# Patient Record
Sex: FEMALE | Race: WHITE | HISPANIC OR LATINO | URBAN - METROPOLITAN AREA
[De-identification: names, ages, dates, MRNs, and addresses within clinical notes are randomized per-mention and may not be internally consistent; named-entity substitution may affect disease eponyms.]

---

## 2018-07-23 ENCOUNTER — OFFICE VISIT (OUTPATIENT)
Dept: OBGYN CLINIC | Facility: CLINIC | Age: 48
End: 2018-07-23
Payer: COMMERCIAL

## 2018-07-23 VITALS
BODY MASS INDEX: 33.2 KG/M2 | WEIGHT: 147.6 LBS | HEIGHT: 56 IN | HEART RATE: 79 BPM | DIASTOLIC BLOOD PRESSURE: 83 MMHG | SYSTOLIC BLOOD PRESSURE: 117 MMHG

## 2018-07-23 DIAGNOSIS — R30.9 PAINFUL URINATION: Primary | ICD-10-CM

## 2018-07-23 DIAGNOSIS — R31.9 URINARY TRACT INFECTION WITH HEMATURIA, SITE UNSPECIFIED: ICD-10-CM

## 2018-07-23 DIAGNOSIS — N39.0 URINARY TRACT INFECTION WITH HEMATURIA, SITE UNSPECIFIED: ICD-10-CM

## 2018-07-23 LAB
SL AMB  POCT GLUCOSE, UA: NEGATIVE
SL AMB LEUKOCYTE ESTERASE,UA: NEGATIVE
SL AMB POCT BILIRUBIN,UA: ABNORMAL
SL AMB POCT BLOOD,UA: ABNORMAL
SL AMB POCT CLARITY,UA: ABNORMAL
SL AMB POCT COLOR,UA: YELLOW
SL AMB POCT KETONES,UA: ABNORMAL
SL AMB POCT NITRITE,UA: POSITIVE
SL AMB POCT PH,UA: 6
SL AMB POCT SPECIFIC GRAVITY,UA: 1.02
SL AMB POCT URINE PROTEIN: NEGATIVE
SL AMB POCT UROBILINOGEN: 0.2

## 2018-07-23 PROCEDURE — 87186 SC STD MICRODIL/AGAR DIL: CPT | Performed by: NURSE PRACTITIONER

## 2018-07-23 PROCEDURE — 99202 OFFICE O/P NEW SF 15 MIN: CPT | Performed by: NURSE PRACTITIONER

## 2018-07-23 PROCEDURE — 87077 CULTURE AEROBIC IDENTIFY: CPT | Performed by: NURSE PRACTITIONER

## 2018-07-23 PROCEDURE — 81002 URINALYSIS NONAUTO W/O SCOPE: CPT | Performed by: NURSE PRACTITIONER

## 2018-07-23 PROCEDURE — 87086 URINE CULTURE/COLONY COUNT: CPT | Performed by: NURSE PRACTITIONER

## 2018-07-23 RX ORDER — IBUPROFEN 600 MG/1
TABLET ORAL
COMMUNITY
Start: 2015-04-17

## 2018-07-23 RX ORDER — NITROFURANTOIN 25; 75 MG/1; MG/1
100 CAPSULE ORAL 2 TIMES DAILY
Qty: 14 CAPSULE | Refills: 0 | Status: SHIPPED | OUTPATIENT
Start: 2018-07-23 | End: 2018-07-30

## 2018-07-23 NOTE — PATIENT INSTRUCTIONS
Disuria   LO QUE NECESITA SABER:   La disuria es la dificultad al orinar, o el dolor, ardor o molestias al Isaac Sayres  La disuria por lo general es un síntoma de algún otro problema  INSTRUCCIONES SOBRE EL ROM HOSPITALARIA:   Regrese a la antony de emergencias si:   · Usted tiene dolor intenso en la espalda, en un costado o en el abdomen  · Usted tiene fiebre y escalofríos con temblor  · Usted vomita varias veces seguidas  Pregúntele a moeller [employer] vitaminas y minerales son adecuados para usted  · Sintia síntomas no desaparecen, aún después del tratamiento  · Usted tiene preguntas o inquietudes acerca de moeller condición o cuidado  Medicamentos:   · Medicamentos,  para ayudar a tratar rafael infección bacteriana o para disminuir los espasmos musculares  · [place] sintia medicamentos mynor se le haya indicado  Consulte con moeller médico si usted med que moeller medicamento no le está ayudando o si presenta efectos secundarios  Infórmele si es alérgico a algún medicamento  Mantenga rafael lista actualizada de los [place], las vitaminas y los productos herbales que margo  Incluya los siguientes datos de los medicamentos: cantidad, frecuencia y motivo de administración  Traiga con usted la lista o los envases de la píldoras a sintia citas de seguimiento  Lleve la lista de los medicamentos con usted en jaimie de rafael emergencia  Acuda a sintia consultas de control con moeller médico según le indicaron  Moeller médico podría referirlo a un urólogo o nefrólogo para que le realicen exámenes adicionales  Anote sintia preguntas para que se acuerde de hacerlas kevin sintia visitas  Controle moeller disuria:   · Ingiera más líquidos  Los líquidos ayudan a desechar las bacterias que estén provocando rafael infección  Pregunte a moeller médico sobre la cantidad de líquido que necesita alison todos los días y cuáles le recomienda  · [place] eagle de asiento mynor se le indique  Llene rafael marizol de baño con 4 a 6 pulgadas de [place]   [employer] usar un recipiente para baño de asiento que quepa en el inodoro  Siéntese en el baño de marizol por 20 minutos  Vega esto 2 a 3 veces a la semana según le indicaron  El agua cálida va a ayudara reducir el dolor y la inflamación  © 2017 [address] [employer] Information is for End User's use only and may not be sold, redistributed or otherwise used for commercial purposes  All illustrations and images included in CareNotes® are the copyrighted property of A D A M , Inc  or Pardeep [place]  Esta información es sólo para uso en educación  Denney intención no es darle un consejo médico sobre enfermedades o tratamientos  Colsulte con denney Dewain Racer farmacéutico antes de seguir cualquier régimen médico para saber si es seguro y efectivo para usted

## 2018-07-23 NOTE — PROGRESS NOTES
Assessment/Plan:    Will treat with nitrofurantoin 100 mgs , 1 pill b i d  for 7 days  Call if symptoms not improved within 2 days  Urine culture sent  Patient to return to office in 1-2 weeks for her annual gyn exam and Pap Co test      Diagnoses and all orders for this visit:    Painful urination  -     POCT urine dip  -     nitrofurantoin (MACROBID) 100 mg capsule; Take 1 capsule (100 mg total) by mouth 2 (two) times a day for 7 days    Urinary tract infection without hematuria, site unspecified  -     nitrofurantoin (MACROBID) 100 mg capsule; Take 1 capsule (100 mg total) by mouth 2 (two) times a day for 7 days    Other orders  -     ibuprofen (MOTRIN) 600 mg tablet; Take by mouth          Subjective:      Patient ID: Ryan Larose is a 51 y o  female  HPI 52 yo  here With complaints of pain with urination and voiding in small amounts that recently started  She denies any blood in her urine  Denies any fever chills  Denies any kind of vaginal discharge  She denies any pelvic pain or back pain  She is sexually active with her  for 2 years  She denies any STD history  Chart review shows last Pap was 2015 which was ASCUS with positive HPV, -16 and -18  Patient had a colpo which showed a normal appearing cervix ECC was negative  She was due for a repeat Pap and Co test 1 year later     Previous Pap done 2014 was normal with positive high-risk HPV, negative HPV 16 and 18  She will return for her annual gyn exam   Urine dip today shows positive nitrates, moderate blood, and cloudy appearance  Patient had history of tubal ligation  The following portions of the patient's history were reviewed and updated as appropriate: allergies, current medications, past family history, past medical history, past social history, past surgical history and problem list     Review of Systems   Respiratory: Negative  Cardiovascular: Negative  Genitourinary: Positive for dysuria and frequency  Negative for difficulty urinating, vaginal bleeding and vaginal discharge  Psychiatric/Behavioral: Negative  Objective:      /83 (BP Location: Left arm, Patient Position: Sitting, Cuff Size: Standard)   Pulse 79   Ht 4' 8" (1 422 m)   Wt 67 kg (147 lb 9 6 oz)   LMP 06/25/2018 (Approximate)   Breastfeeding? No   BMI 33 09 kg/m²          Physical Exam   Constitutional: She appears well-nourished  Cardiovascular: Normal rate, regular rhythm and normal heart sounds  Pulmonary/Chest: Effort normal and breath sounds normal    Abdominal: Soft  Bowel sounds are normal    Skin: Skin is dry  Psychiatric: She has a normal mood and affect   Her behavior is normal        Urine dip positive for moderate blood, positive nitrates

## 2018-07-25 LAB — BACTERIA UR CULT: ABNORMAL

## 2018-08-24 ENCOUNTER — TELEPHONE (OUTPATIENT)
Dept: UROLOGY | Facility: MEDICAL CENTER | Age: 48
End: 2018-08-24

## 2018-08-24 ENCOUNTER — TELEPHONE (OUTPATIENT)
Dept: UROLOGY | Facility: CLINIC | Age: 48
End: 2018-08-24

## 2018-08-24 NOTE — TELEPHONE ENCOUNTER
Please schedule for NP appt with PA or MD in 7 to 10 days,  Patient to bring copy of KUB on disc  Please obtain written KUB report from [facility], report should be final 8/25/18

## 2018-08-24 NOTE — TELEPHONE ENCOUNTER
Patient had abnormal test [facility] showing kidney stones with pain  [facility] assisting due to speaking Nepali  Reason for appointment/Complaint/Diagnosis : abnormal imaging showing kidney stones presenting with pain    Insurance: [facility]    History of Cancer? no                       If yes, what kind? none    Previous urologist?     no                  Records requested/where? [facility] [address]    Outside testing/where? [facility]    Location Preference for office visit?  [facility]

## 2018-08-24 NOTE — TELEPHONE ENCOUNTER
Records received, no copy of KUB,  Noted to be treated for acute pyelonephritis  [facility]ed [facility] at [phone], spoke to nursing, today's visit was a follow up for acute pyelonephritis, KUB done, it is not read by their radiologist yet  Advised to follow up with PCP  [facility] to [facility] Naye Ashley at [facility],  Patient initially presented on 8/20/18 for back pain/fever, treated for pyelonepritis with Levaquin, patient returned for recheck today, back pain resolved, having pelvic pressure, patient thinks she passed a small particle in urine, patient is on Levaquin, no acute distress, no definitive stone seen on KUB,  Patient started on Tamsulosin and provided with strainer and has copy of KUB on disc

## 2018-08-27 NOTE — TELEPHONE ENCOUNTER
No appointments within 7 to 10 days with [place] speaking doctor  Triage to coordinator for assistance with scheduling

## 2018-08-31 PROCEDURE — 82360 CALCULUS ASSAY QUANT: CPT | Performed by: UROLOGY

## 2018-08-31 PROCEDURE — 88300 SURGICAL PATH GROSS: CPT | Performed by: PATHOLOGY

## 2018-09-04 ENCOUNTER — LAB REQUISITION (OUTPATIENT)
Dept: LAB | Facility: HOSPITAL | Age: 48
End: 2018-09-04
Payer: COMMERCIAL

## 2018-09-04 DIAGNOSIS — N20.9 URINARY CALCULUS: ICD-10-CM

## 2018-09-08 LAB
AMM URATE MFR STONE: 15 %
CA PHOS MFR STONE: 5 %
CALCIUM OXALATE DIHYDRATE MFR STONE IR: 3 %
COLOR STONE: NORMAL
COM MFR STONE: 77 %
COMMENT-STONE3: NORMAL
COMPOSITION: NORMAL
LABORATORY COMMENT REPORT: NORMAL
LABORATORY COMMENT REPORT: NORMAL
NIDUS STONE QL: NORMAL
PHOTO: NORMAL
SIZE STONE: NORMAL MM
STONE ANALYSIS-IMP: NORMAL
WT STONE: 28.3 MG

## 2018-09-17 ENCOUNTER — TELEPHONE (OUTPATIENT)
Dept: UROLOGY | Facility: AMBULATORY SURGERY CENTER | Age: 48
End: 2018-09-17

## 2018-09-17 NOTE — TELEPHONE ENCOUNTER
Certified letter return to [facility]([phone]) unclaimed

## 2021-08-04 PROBLEM — Z00.00 ANNUAL PHYSICAL EXAM: Status: ACTIVE | Noted: 2021-08-04

## 2021-08-29 NOTE — PROGRESS NOTES
Assessment/Plan:         Problem List Items Addressed This Visit        Other    Annual physical exam - Primary     Routine labs         Relevant Orders    CBC and differential    Comprehensive metabolic panel    Lipid panel    Urinalysis with microscopic    Symptoms of depression     Will start  escitalopram  Side effect and warnings given to pt follow up 1 mo         Relevant Medications    escitalopram (LEXAPRO) 10 mg tablet      Other Visit Diagnoses     Need for hepatitis C screening test        Relevant Orders    Hepatitis C Antibody ([facility])    Encounter for screening mammogram for malignant neoplasm of breast        Relevant Orders    Mammo screening bilateral w 3d & cad    Encounter for screening colonoscopy        Relevant Orders    Ambulatory referral for colonoscopy    Immunization due        Relevant Orders    TDAP VACCINE GREATER THAN OR EQUAL TO 6YO IM            Subjective: new pt here for physical pt has had problems with depression still grieving mothers death  Last month pt was depressed before that crying spells all the time wants meds        Patient ID: Vilma Aldrich is a 45 y o  female  HPI    The following portions of the patient's history were reviewed and updated as appropriate:   Past Medical History:  She has no past medical history on file ,  _______________________________________________________________________  Medical Problems:  does not have any pertinent problems on file ,  _______________________________________________________________________  Past Surgical History:   has a past surgical history that includes Tubal ligation  ,  _______________________________________________________________________  Family History:  family history includes No Known Problems in her father and mother ,  _______________________________________________________________________  Social History:   reports that she has never smoked   She has never used smokeless tobacco  She reports current alcohol use  She reports that she does not use drugs  ,  _______________________________________________________________________  Allergies:  is allergic to penicillins     _______________________________________________________________________  Current Outpatient Medications   Medication Sig Dispense Refill    escitalopram (LEXAPRO) 10 mg tablet Take 1 tablet (10 mg total) by mouth daily 30 tablet 6    ibuprofen (MOTRIN) 600 mg tablet Take by mouth (Patient not taking: Reported on 9/1/2021)       No current facility-administered medications for this visit      _______________________________________________________________________  Review of Systems   Constitutional: Negative for appetite change, chills, fatigue and fever  Respiratory: Negative for cough, chest tightness and shortness of breath  Cardiovascular: Negative for chest pain, palpitations and leg swelling  Gastrointestinal: Negative for abdominal pain, constipation, diarrhea, nausea and vomiting  Genitourinary: Negative for difficulty urinating and frequency  Musculoskeletal: Negative for arthralgias, back pain and neck pain  Skin: Negative for rash  Neurological: Negative for dizziness, weakness, light-headedness, numbness and headaches  Hematological: Does not bruise/bleed easily  Psychiatric/Behavioral: Negative for dysphoric mood and sleep disturbance  The patient is not nervous/anxious  Objective:  Vitals:    09/01/21 0845   BP: 124/84   BP Location: Left arm   Patient Position: Sitting   Cuff Size: Standard   Pulse: 74   Temp: (!) 97 1 °F (36 2 °C)   TempSrc: Temporal   SpO2: 98%   Weight: 62 1 kg (137 lb)   Height: 5' (1 524 m)     Body mass index is 26 76 kg/m²  Physical Exam  Vitals reviewed  Constitutional:       General: She is not in acute distress  Appearance: Normal appearance  She is well-developed  HENT:      Head: Normocephalic        Right Ear: Tympanic membrane, ear canal and external ear normal  Left Ear: Tympanic membrane, ear canal and external ear normal       Nose: Nose normal       Mouth/Throat:      Pharynx: No oropharyngeal exudate  Eyes:      General: Lids are normal       Extraocular Movements: Extraocular movements intact  Conjunctiva/sclera: Conjunctivae normal       Pupils: Pupils are equal, round, and reactive to light  Neck:      Thyroid: No thyromegaly  Vascular: No carotid bruit  Cardiovascular:      Rate and Rhythm: Normal rate and regular rhythm  Pulses: Normal pulses  Heart sounds: Normal heart sounds  No murmur heard  No friction rub  Pulmonary:      Effort: Pulmonary effort is normal  No respiratory distress  Breath sounds: Normal breath sounds  No stridor  No wheezing or rales  Chest:      Breasts: Breasts are symmetrical          Right: Normal  No swelling, bleeding, inverted nipple, mass, nipple discharge, skin change or tenderness  Left: Normal  No swelling, bleeding, inverted nipple, mass, nipple discharge, skin change or tenderness  Comments: Breast lift areola scars  Abdominal:      General: Bowel sounds are normal  There is no distension  Palpations: Abdomen is soft  There is no mass  Tenderness: There is no abdominal tenderness  There is no guarding  Hernia: No hernia is present  Musculoskeletal:         General: Normal range of motion  Cervical back: Full passive range of motion without pain, normal range of motion and neck supple  Lymphadenopathy:      Cervical: No cervical adenopathy  Skin:     General: Skin is warm and dry  Findings: No rash  Comments: Nl appearing moles; Abdominoplasty scar    Neurological:      General: No focal deficit present  Mental Status: She is alert and oriented to person, place, and time  Mental status is at baseline  Cranial Nerves: No cranial nerve deficit  Sensory: No sensory deficit  Motor: No abnormal muscle tone        Coordination: Coordination normal       Gait: Gait normal       Deep Tendon Reflexes: Reflexes normal  Babinski sign absent on the right side  Psychiatric:         Mood and Affect: Mood normal          Speech: Speech normal          Behavior: Behavior normal          Thought Content: Thought content normal          Judgment: Judgment normal        BMI Counseling: Body mass index is 26 76 kg/m²  The BMI is above normal  Nutrition recommendations include 3-5 servings of fruits/vegetables daily, reducing fast food intake, consuming healthier snacks, decreasing soda and/or juice intake, moderation in carbohydrate intake and increasing intake of lean protein  Exercise recommendations include exercising 3-5 times per week and strength training exercises

## 2021-09-01 ENCOUNTER — OFFICE VISIT (OUTPATIENT)
Dept: FAMILY MEDICINE CLINIC | Facility: CLINIC | Age: 51
End: 2021-09-01
Payer: COMMERCIAL

## 2021-09-01 VITALS
WEIGHT: 137 LBS | BODY MASS INDEX: 26.9 KG/M2 | SYSTOLIC BLOOD PRESSURE: 124 MMHG | TEMPERATURE: 97.1 F | HEART RATE: 74 BPM | DIASTOLIC BLOOD PRESSURE: 84 MMHG | OXYGEN SATURATION: 98 % | HEIGHT: 60 IN

## 2021-09-01 DIAGNOSIS — Z12.11 ENCOUNTER FOR SCREENING COLONOSCOPY: ICD-10-CM

## 2021-09-01 DIAGNOSIS — Z11.59 NEED FOR HEPATITIS C SCREENING TEST: ICD-10-CM

## 2021-09-01 DIAGNOSIS — Z23 IMMUNIZATION DUE: ICD-10-CM

## 2021-09-01 DIAGNOSIS — Z00.00 ANNUAL PHYSICAL EXAM: Primary | ICD-10-CM

## 2021-09-01 DIAGNOSIS — R45.89 SYMPTOMS OF DEPRESSION: ICD-10-CM

## 2021-09-01 DIAGNOSIS — Z12.31 ENCOUNTER FOR SCREENING MAMMOGRAM FOR MALIGNANT NEOPLASM OF BREAST: ICD-10-CM

## 2021-09-01 PROCEDURE — 99386 PREV VISIT NEW AGE 40-64: CPT | Performed by: FAMILY MEDICINE

## 2021-09-01 PROCEDURE — 90715 TDAP VACCINE 7 YRS/> IM: CPT | Performed by: FAMILY MEDICINE

## 2021-09-01 PROCEDURE — 90471 IMMUNIZATION ADMIN: CPT | Performed by: FAMILY MEDICINE

## 2021-09-01 RX ORDER — ESCITALOPRAM OXALATE 10 MG/1
10 TABLET ORAL DAILY
Qty: 30 TABLET | Refills: 6 | Status: SHIPPED | OUTPATIENT
Start: 2021-09-01 | End: 2022-05-10 | Stop reason: ALTCHOICE

## 2021-10-15 ENCOUNTER — TELEPHONE (OUTPATIENT)
Dept: GASTROENTEROLOGY | Facility: CLINIC | Age: 51
End: 2021-10-15

## 2022-05-10 ENCOUNTER — ANNUAL EXAM (OUTPATIENT)
Dept: OBGYN CLINIC | Facility: CLINIC | Age: 52
End: 2022-05-10
Payer: COMMERCIAL

## 2022-05-10 VITALS
WEIGHT: 136.4 LBS | HEIGHT: 60 IN | BODY MASS INDEX: 26.78 KG/M2 | DIASTOLIC BLOOD PRESSURE: 72 MMHG | SYSTOLIC BLOOD PRESSURE: 126 MMHG

## 2022-05-10 DIAGNOSIS — Z12.31 SCREENING MAMMOGRAM, ENCOUNTER FOR: ICD-10-CM

## 2022-05-10 DIAGNOSIS — Z12.11 SCREENING FOR MALIGNANT NEOPLASM OF COLON: ICD-10-CM

## 2022-05-10 DIAGNOSIS — Z12.4 ENCOUNTER FOR PAPANICOLAOU SMEAR FOR CERVICAL CANCER SCREENING: ICD-10-CM

## 2022-05-10 DIAGNOSIS — Z01.419 WOMEN'S ANNUAL ROUTINE GYNECOLOGICAL EXAMINATION: Primary | ICD-10-CM

## 2022-05-10 DIAGNOSIS — N89.8 VAGINAL DISCHARGE: ICD-10-CM

## 2022-05-10 PROCEDURE — G0145 SCR C/V CYTO,THINLAYER,RESCR: HCPCS | Performed by: OBSTETRICS & GYNECOLOGY

## 2022-05-10 PROCEDURE — G0476 HPV COMBO ASSAY CA SCREEN: HCPCS | Performed by: OBSTETRICS & GYNECOLOGY

## 2022-05-10 PROCEDURE — 99386 PREV VISIT NEW AGE 40-64: CPT | Performed by: OBSTETRICS & GYNECOLOGY

## 2022-05-10 PROCEDURE — 81514 NFCT DS BV&VAGINITIS DNA ALG: CPT | Performed by: OBSTETRICS & GYNECOLOGY

## 2022-05-10 NOTE — PATIENT INSTRUCTIONS
Mammogram   AMBULATORY CARE:   What you need to know about a mammogram:  A mammogram is an x-ray of your breasts to screen for breast cancer  Your healthcare provider will talk with you about the benefits and risks of mammograms  Together you will decide when you will get your first mammogram  This is usually at age 39 or 47  Your provider may recommend you start at 36 or younger if your risk for breast cancer is high  Mammograms usually continue every 1 to 2 years until age 76  How to prepare for a mammogram:   · Do not use deodorant, powder, lotion, or perfume  These products may cause particles to appear on your mammogram     · Wear a 2-piece outfit  · If your breasts are tender before your monthly period, do not have a mammogram during this time  Schedule your mammogram for 1 week after your period ends  · If you are breastfeeding, express as much milk as possible before the mammogram     · Bring a list of the dates and places of your past mammograms and other breast tests or treatments  What will happen during a mammogram:  A top view and a side view x-ray are usually done for each breast  Tell healthcare providers if you have breast implants or breast problems before you have your mammogram  You may need extra x-rays of each breast   · You will be given a hospital gown  Take off your clothes from the waist up  Wear the hospital gown so that it opens in the front  · You will sit or stand next to a small x-ray machine  The healthcare provider will help you place one of your breasts on the x-ray plate  Your arm and breast will be moved until your breast is in the correct position  · Your breast will be gently pressed between 2 plates for a few seconds while the x-ray is taken  This may be uncomfortable  · You will be asked to hold your breath while the x-ray is taken  Another x-ray will be taken of the same breast after the position of the x-ray machine has been changed      · Your other breast will be x-rayed the same way  What will happen after your mammogram:  Your breasts may feel tender for a short time after the mammogram  You may go back to your regular activities  Ask your healthcare provider when you should receive the results of your mammogram   Risks of a mammogram:  You will be exposed to a small amount of radiation  Some breast cancers may not show up on mammograms  Call your doctor if:   · You do not receive your results when expected  · You have questions or concerns about the mammogram     Follow up with your doctor as directed:  Write down your questions so you remember to ask them during your visits  © Copyright [employer] 2022 Information is for End User's use only and may not be sold, redistributed or otherwise used for commercial purposes  All illustrations and images included in CareNotes® are the copyrighted property of [employer]  or Addis Hernandez  The above information is an  only  It is not intended as medical advice for individual conditions or treatments  Talk to your doctor, nurse or pharmacist before following any medical regimen to see if it is safe and effective for you

## 2022-05-10 NOTE — PROGRESS NOTES
Gideon Wang is a 47 y o  female who presents for annual well woman exam       Post menopuase 46 yo    Menstrual History:  OB History        4    Para   3    Term   3            AB   1    Living   3       SAB        IAB   1    Ectopic        Multiple        Live Births                      Patient's last menstrual period was 2018 (approximate)  The following portions of the patient's history were reviewed and updated as appropriate: allergies, current medications, past family history, past medical history, past social history, past surgical history and problem list     Review of Systems  Review of Systems   Constitutional: Negative for activity change, appetite change, chills, fatigue and fever  Respiratory: Negative for cough and shortness of breath  Cardiovascular: Negative for chest pain, palpitations and leg swelling  Gastrointestinal: Negative for abdominal pain, constipation, diarrhea, nausea and vomiting  Genitourinary: Negative for difficulty urinating, dysuria, flank pain, frequency, hematuria, urgency and vaginal discharge  Ithcing   Neurological: Negative for dizziness and headaches  Psychiatric/Behavioral: Negative for confusion            Objective      /72 (BP Location: Left arm, Patient Position: Sitting, Cuff Size: Adult)   Ht 5' (1 524 m)   Wt 61 9 kg (136 lb 6 4 oz)   LMP 2018 (Approximate)   BMI 26 64 kg/m²     Physical Exam  Physical Exam  Genitourinary:                General:   alert and oriented, in no acute distress, alert, appears stated age and cooperative   Heart: regular rate and rhythm, S1, S2 normal, no murmur, click, rub or gallop   Lungs: clear to auscultation bilaterally   Abdomen: soft, non-tender, without masses or organomegaly   Vulva:  vulvar lesion noted on the anterior fourchette on the right side area where patient having itching patient instructed to return to office for biopsy   Vagina: normal mucosa, normal discharge   Cervix: no cervical motion tenderness and no lesions   Uterus: normal size   Adnexa:  Breast Exam:  normal adnexa  breasts appear normal, no suspicious masses, no skin or nipple changes or axillary nodes  Assessment      @well woman@   71-year-old female  Annual exam   Vulvar lesion/itching  Post menopause  2 , 1 c/s btl       Plan   Pap/HPV   Diet/exercise  Calcium/vitamin-D  Mammogram  Declined colonoscopy Cologuard script given   Return to office for vulvar biopsy   All questions answered  There are no Patient Instructions on file for this visit

## 2022-05-11 LAB
C GLABRATA DNA VAG QL NAA+PROBE: NEGATIVE
C KRUSEI DNA VAG QL NAA+PROBE: NEGATIVE
CANDIDA SP 6 PNL VAG NAA+PROBE: NEGATIVE
T VAGINALIS DNA VAG QL NAA+PROBE: NEGATIVE
VAGINOSIS/ITIS DNA PNL VAG PROBE+SIG AMP: POSITIVE

## 2022-05-12 DIAGNOSIS — N76.0 BV (BACTERIAL VAGINOSIS): Primary | ICD-10-CM

## 2022-05-12 DIAGNOSIS — B96.89 BV (BACTERIAL VAGINOSIS): Primary | ICD-10-CM

## 2022-05-12 LAB
HPV HR 12 DNA CVX QL NAA+PROBE: NEGATIVE
HPV16 DNA CVX QL NAA+PROBE: NEGATIVE
HPV18 DNA CVX QL NAA+PROBE: NEGATIVE

## 2022-05-12 RX ORDER — METRONIDAZOLE 500 MG/1
500 TABLET ORAL EVERY 12 HOURS SCHEDULED
Qty: 14 TABLET | Refills: 0 | Status: SHIPPED | OUTPATIENT
Start: 2022-05-12 | End: 2022-05-19

## 2022-05-18 LAB
LAB AP GYN PRIMARY INTERPRETATION: NORMAL
Lab: NORMAL
PATH INTERP SPEC-IMP: NORMAL

## 2022-05-31 ENCOUNTER — PROCEDURE VISIT (OUTPATIENT)
Dept: OBGYN CLINIC | Facility: CLINIC | Age: 52
End: 2022-05-31
Payer: COMMERCIAL

## 2022-05-31 VITALS
SYSTOLIC BLOOD PRESSURE: 124 MMHG | WEIGHT: 136 LBS | DIASTOLIC BLOOD PRESSURE: 68 MMHG | BODY MASS INDEX: 26.7 KG/M2 | HEIGHT: 60 IN

## 2022-05-31 DIAGNOSIS — N90.89 VULVAR LESION: Primary | ICD-10-CM

## 2022-05-31 PROCEDURE — 56605 BIOPSY OF VULVA/PERINEUM: CPT | Performed by: OBSTETRICS & GYNECOLOGY

## 2022-05-31 PROCEDURE — 3008F BODY MASS INDEX DOCD: CPT | Performed by: OBSTETRICS & GYNECOLOGY

## 2022-05-31 RX ORDER — MULTIVITAMIN
1 TABLET ORAL DAILY
COMMUNITY

## 2022-05-31 NOTE — PROGRESS NOTES
Assessment/Plan:     Diagnoses and all orders for this visit:    Vulvar lesion    Other orders  -     Multiple Vitamin (multivitamin) tablet; Take 1 tablet by mouth daily        58-year-old female   Vulvar lesion/itching   Post menopause  Prior 2 vaginal delivery 1 C section with tubal ligation   Plan   Vulvar biopsy   We will call patient with results   Subjective:      Patient ID: Jenae Woodard is a 47 y o  female  HPI  58-year-old female presents to the office today for vulvar biopsy secondary to vulvar lesion noted at the time of the annual exam patient also was complaining itching at the area the lesion  The following portions of the patient's history were reviewed and updated as appropriate: allergies, current medications, past family history, past medical history, past social history, past surgical history and problem list     Review of Systems      Objective:      /68 (BP Location: Left arm, Patient Position: Sitting, Cuff Size: Adult)   Ht 5' (1 524 m)   Wt 61 7 kg (136 lb)   LMP 06/25/2018 (Approximate)   BMI 26 56 kg/m²          Physical Exam  Genitourinary:            Biopsy    Date/Time: 5/31/2022 6:09 PM  Performed by: Shanel Vasquez MD  Authorized by: Shanel Vasquez MD   Universal Protocol:  Consent: Verbal consent obtained  Risks and benefits: risks, benefits and alternatives were discussed  Consent given by: patient  Time out: Immediately prior to procedure a "time out" was called to verify the correct patient, procedure, equipment, support staff and site/side marked as required    Patient understanding: patient states understanding of the procedure being performed  Patient consent: the patient's understanding of the procedure matches consent given  Procedure consent: procedure consent matches procedure scheduled  Patient identity confirmed: verbally with patient      Procedure Details - Skin Biopsy:     Biopsy tissue type: skin and subcutaneous    Biopsy method: punch biopsy      Body area:  Anogenital    Anogenital location:  Vulva    Vaginal Lesion: Yes       Lidocaine with epi was used for anesthesia following that punch biopsy performed as prescribed above hemostasis obtained using single 4 0 Vicryl suture with good hemostasis bacitracin ointment apply patient tolerated procedure well

## 2022-06-03 LAB — COLOGUARD RESULT REPORTABLE: NEGATIVE

## 2022-10-18 ENCOUNTER — OFFICE VISIT (OUTPATIENT)
Dept: OBGYN CLINIC | Facility: CLINIC | Age: 52
End: 2022-10-18
Payer: COMMERCIAL

## 2022-10-18 VITALS
HEIGHT: 60 IN | BODY MASS INDEX: 26.58 KG/M2 | DIASTOLIC BLOOD PRESSURE: 76 MMHG | WEIGHT: 135.4 LBS | SYSTOLIC BLOOD PRESSURE: 138 MMHG

## 2022-10-18 DIAGNOSIS — N90.89 VULVAR LESION: ICD-10-CM

## 2022-10-18 DIAGNOSIS — L29.2 VULVAR ITCHING: Primary | ICD-10-CM

## 2022-10-18 PROCEDURE — 99213 OFFICE O/P EST LOW 20 MIN: CPT | Performed by: OBSTETRICS & GYNECOLOGY

## 2022-10-18 NOTE — PROGRESS NOTES
Assessment/Plan:     Diagnoses and all orders for this visit:    Vulvar itching  -     Tissue Exam    Vulvar lesion        69-year-old female   Vulvar lesion/itching   Post menopause  Prior 2 vaginal delivery 1 C section with tubal ligation   Plan   Vulvar biopsy   We will call patient with results  Subjective:      Patient ID: Avani Young is a 47 y o  female  HPI  Patient seen evaluated presents to the office today for vulvar biopsy secondary to vulvar lesion noted on the anterior fourchette/labia minora causing mild irritation patient has prior biopsy specimen was not found    The following portions of the patient's history were reviewed and updated as appropriate: allergies, current medications, past family history, past medical history, past social history, past surgical history and problem list     Review of Systems      Objective:      /76 (BP Location: Left arm, Patient Position: Sitting, Cuff Size: Adult)   Ht 5' (1 524 m)   Wt 61 4 kg (135 lb 6 4 oz)   LMP 06/25/2018 (Approximate)   BMI 26 44 kg/m²          Physical Exam  Genitourinary:                  Physical Exam  Genitourinary:           Biopsy       Performed by: Cori Douglass MD  Authorized by: Cori Douglass MD   Universal Protocol:  Consent: Verbal consent obtained  Risks and benefits: risks, benefits and alternatives were discussed  Consent given by: patient  Time out: Immediately prior to procedure a "time out" was called to verify the correct patient, procedure, equipment, support staff and site/side marked as required    Patient understanding: patient states understanding of the procedure being performed  Patient consent: the patient's understanding of the procedure matches consent given  Procedure consent: procedure consent matches procedure scheduled  Patient identity confirmed: verbally with patient        Procedure Details - Skin Biopsy:     Biopsy tissue type: skin and subcutaneous    Biopsy method: punch biopsy      Body area:  Anogenital    Anogenital location:  Vulva    Vaginal Lesion: Yes       Lidocaine with epi was used for anesthesia following that punch biopsy performed as prescribed above hemostasis obtained using silver nitrate with good hemostasis bacitracin ointment apply patient tolerated procedure well

## 2022-10-26 ENCOUNTER — OFFICE VISIT (OUTPATIENT)
Dept: OBGYN CLINIC | Facility: CLINIC | Age: 52
End: 2022-10-26
Payer: COMMERCIAL

## 2022-10-26 VITALS
WEIGHT: 135.6 LBS | BODY MASS INDEX: 26.62 KG/M2 | HEIGHT: 60 IN | SYSTOLIC BLOOD PRESSURE: 142 MMHG | DIASTOLIC BLOOD PRESSURE: 80 MMHG

## 2022-10-26 DIAGNOSIS — R10.2 PELVIC PAIN: Primary | ICD-10-CM

## 2022-10-26 PROCEDURE — 99213 OFFICE O/P EST LOW 20 MIN: CPT | Performed by: OBSTETRICS & GYNECOLOGY

## 2022-10-27 NOTE — PROGRESS NOTES
Assessment/Plan:     Diagnoses and all orders for this visit:    Pelvic pain  -     US pelvis complete w transvaginal; Future        58-year-old female  Vulvar lesion status post biopsy   Post menopause  Prior to vaginal delivery 1 C section  New episodes of pelvic pain  Plan   Pelvic ultrasound  NSAIDs p r n  pain   Biopsy results still pending  Hygiene discussed with patient as she is going for vacation       Subjective:      Patient ID: Jaqui Cat is a 47 y o  female  Patient seen presents to the office today for vulvar biopsy check as she is going for vacation to the beach as well as complaining of mild pelvic pain    Pelvic Pain  The patient's primary symptoms include pelvic pain  This is a new problem  The current episode started 1 to 4 weeks ago  The problem occurs intermittently  The problem has been unchanged  The pain is mild  The problem affects both sides  Associated symptoms include abdominal pain  Pertinent negatives include no back pain, constipation, diarrhea, dysuria, fever, hematuria, nausea, painful intercourse, rash or urgency  Nothing aggravates the symptoms  She is postmenopausal        The following portions of the patient's history were reviewed and updated as appropriate: allergies, current medications, past family history, past medical history, past social history, past surgical history and problem list     Review of Systems   Constitutional: Negative for fever  Gastrointestinal: Positive for abdominal pain  Negative for constipation, diarrhea and nausea  Genitourinary: Positive for pelvic pain  Negative for dysuria, hematuria and urgency  Musculoskeletal: Negative for back pain  Skin: Negative for rash           Objective:      /80 (BP Location: Left arm, Patient Position: Sitting, Cuff Size: Adult)   Ht 5' (1 524 m)   Wt 61 5 kg (135 lb 9 6 oz)   LMP 06/25/2018 (Approximate)   BMI 26 48 kg/m²          Physical Exam  Constitutional:       Appearance: She is well-developed  Abdominal:      General: There is no distension  Palpations: Abdomen is soft  Tenderness: There is no abdominal tenderness  Genitourinary:     Labia:         Right: No rash, tenderness or lesion  Left: No rash, tenderness or lesion  Vagina: No signs of injury  No vaginal discharge, erythema or tenderness  Cervix: No cervical motion tenderness, discharge or friability  Adnexa:         Right: No mass, tenderness or fullness  Left: No mass, tenderness or fullness  Comments: Area of the biopsy is healing well recommend to use Aquaphor prior going to the beach and swimming to avoid irritation to the area    Very mild adnexal tenderness noted on exam  Neurological:      Mental Status: She is alert and oriented to person, place, and time     Psychiatric:         Behavior: Behavior normal

## 2022-11-23 ENCOUNTER — OFFICE VISIT (OUTPATIENT)
Dept: OBGYN CLINIC | Facility: CLINIC | Age: 52
End: 2022-11-23

## 2022-11-23 VITALS
BODY MASS INDEX: 26.7 KG/M2 | HEIGHT: 60 IN | WEIGHT: 136 LBS | SYSTOLIC BLOOD PRESSURE: 136 MMHG | DIASTOLIC BLOOD PRESSURE: 72 MMHG

## 2022-11-23 DIAGNOSIS — L90.0 LICHEN SCLEROSUS: Primary | ICD-10-CM

## 2022-11-23 NOTE — PROGRESS NOTES
Assessment/Plan:     Diagnoses and all orders for this visit:    Lichen sclerosus          24-year-old female  Vulvar lesion status post biopsy   Biopsy lichen sclerosis possible lichen planus  Post menopause  Prior to vaginal delivery 1 C section  New episodes of pelvic pain  Plan   Female hygiene discussed with patient in details   Lifestyle modification based on the finding of lichen sclerosis/possible lichen planus discussed with patient   As patient currently denies any itching or irritation conservative management and observation for now to return to office if she has any itching or irritation at the area of the biopsy   Or to return to office for annual exam  Patient did not mention about her pelvic pain at the visit today given prescription for pelvic ultrasound at the time of the prior visit instructed to go for her ultrasound  Subjective:      Patient ID: Gilmer Castro is a 47 y o  female  HPI  Patient seen evaluated presents to the office today to discuss results of biopsy was done secondary to vulvar lesion and irritation noted at the time of the prior exam and at the time of the annual exam   Biopsy results review and discussed with patient in the present of [place]i-speaking medical assistant   A  Vulva, Vulvar lesion, biopsy:  - Lichenoid lymphocytic inflammatory infiltrate with subepithelial pallor and melanin     incontinence  See Note  -- Slight epithelial acanthosis with hypergranulosis and hyperkeratosis; no significant necrotic         keratinocytes or eosinophils   - Special stain for fungus (PAS) negative  - Negative for squamous intraepithelial lesion and malignancy  -- Confirmed by p16 (no diffuse positivity) and p53 (wild-type expression) immunostains  -- SOX10 immunostain confirms junctional melanocytes and no atypical melanocytic proliferation       The histologic findings favor an early lesion of lichen sclerosus, but lichen planus remains in the differential diagnosis  Clinical correlation is required  Based on the pathology results suspicious for lichen sclerosis possible lichen planus discussed with patient management option discussed patient said she is currently denies any itching or irritation at the area patient instructed if she has any itching or irritation we need to start on high potency steroid treatment in the meanwhile Female hygiene discussed with patient in details avoid tight clothing avoid white avoid panty liner avoid any irritation to the genital area patient return to office if she is having itching or irritation we can start treatment otherwise to return to office at the time of her annual exam for follow-up All patient questions answered in details and patient was satisfied      The following portions of the patient's history were reviewed and updated as appropriate: allergies, current medications, past family history, past medical history, past social history, past surgical history and problem list     Review of Systems      Objective:      /72 (BP Location: Left arm, Patient Position: Sitting, Cuff Size: Adult)   Ht 5' (1 524 m)   Wt 61 7 kg (136 lb)   LMP 06/25/2018 (Approximate)   BMI 26 56 kg/m²          Physical Exam  Constitutional:       Appearance: Normal appearance  Neurological:      General: No focal deficit present  Mental Status: She is alert and oriented to person, place, and time

## 2023-04-24 ENCOUNTER — OFFICE VISIT (OUTPATIENT)
Dept: OBGYN CLINIC | Facility: CLINIC | Age: 53
End: 2023-04-24

## 2023-04-24 VITALS
BODY MASS INDEX: 27.29 KG/M2 | DIASTOLIC BLOOD PRESSURE: 62 MMHG | SYSTOLIC BLOOD PRESSURE: 128 MMHG | HEIGHT: 60 IN | WEIGHT: 139 LBS

## 2023-04-24 DIAGNOSIS — N95.0 PMB (POSTMENOPAUSAL BLEEDING): Primary | ICD-10-CM

## 2023-04-24 NOTE — PROGRESS NOTES
"Assessment/Plan:     Diagnoses and all orders for this visit:    PMB (postmenopausal bleeding)  -     Tissue Exam          59-year-old female  Vulvar lesion status post biopsy   Biopsy lichen sclerosis possible lichen planus  Post menopause bleeding  Prior to vaginal delivery 1 C section  Plan   Female hygiene discussed with patient in details   Lifestyle modification based on the finding of lichen sclerosis/possible lichen planus discussed with patient   As patient currently denies any itching or irritation conservative management and observation for now to return to office if she has any itching or irritation at the area of the biopsy   EMB done today  Pelvic U/S   rto in 4w f/w and discuss all result    Subjective:      Patient ID: Any Christopher is a 46 y o  female  HPI  Patient seen evaluated present to the office sec to PMB  Started last week   Started taking OTC star   Recommend to stop   And recommend EMB and pelvic U/S   Different etiology of PMB review and disucss  The following portions of the patient's history were reviewed and updated as appropriate: allergies, current medications, past family history, past medical history, past social history, past surgical history and problem list     Review of Systems      Objective:      /62 (BP Location: Left arm, Patient Position: Sitting, Cuff Size: Adult)   Ht 5' (1 524 m)   Wt 63 kg (139 lb)   LMP 06/25/2018 (Approximate)   BMI 27 15 kg/m²          Physical Exam    Endometrial biopsy    Date/Time: 4/24/2023 6:08 PM  Performed by: Khoi London MD  Authorized by: Khoi London MD   Universal Protocol:  Consent: Verbal consent obtained  Risks and benefits: risks, benefits and alternatives were discussed  Consent given by: patient  Time out: Immediately prior to procedure a \"time out\" was called to verify the correct patient, procedure, equipment, support staff and site/side marked as required    Patient understanding: patient states understanding " of the procedure being performed  Patient consent: the patient's understanding of the procedure matches consent given  Procedure consent: procedure consent matches procedure scheduled  Patient identity confirmed: verbally with patient      Indication:     Indications: Post-menopausal bleeding      Chronicity of post-menopausal bleeding:  New    Progression of post-menopausal bleeding:  Unchanged  Procedure:     Procedure: endometrial biopsy with Pipelle      A bivalve speculum was placed in the vagina: yes      Cervix cleaned and prepped: yes      The cervix was dilated: no      Uterus sounded: no      Specimen collected: specimen collected and sent to pathology      Patient tolerated procedure well with no complications: yes    Findings:     Cervix: normal      Adnexa: normal

## 2023-04-26 ENCOUNTER — TELEPHONE (OUTPATIENT)
Dept: OBGYN CLINIC | Facility: CLINIC | Age: 53
End: 2023-04-26

## 2023-04-26 NOTE — TELEPHONE ENCOUNTER
Pt states when she was in the office you offered her medication to stop her bleeding pt denied because she wanted to wait for results to come back from the biopsy  Pt called to check on results and they have not come back  Pt would like to have that medication sent to the pharmacy

## 2023-04-27 DIAGNOSIS — N95.0 POSTMENOPAUSAL BLEEDING: Primary | ICD-10-CM

## 2023-04-27 RX ORDER — MEDROXYPROGESTERONE ACETATE 10 MG/1
10 TABLET ORAL 2 TIMES DAILY
Qty: 10 TABLET | Refills: 0 | Status: SHIPPED | OUTPATIENT
Start: 2023-04-27 | End: 2023-05-02

## 2023-05-04 ENCOUNTER — HOSPITAL ENCOUNTER (OUTPATIENT)
Dept: ULTRASOUND IMAGING | Facility: HOSPITAL | Age: 53
Discharge: HOME | End: 2023-05-04
Attending: OBSTETRICS & GYNECOLOGY

## 2023-05-04 DIAGNOSIS — R10.2 PELVIC PAIN: ICD-10-CM

## 2023-05-10 ENCOUNTER — OFFICE VISIT (OUTPATIENT)
Dept: OBGYN CLINIC | Facility: CLINIC | Age: 53
End: 2023-05-10

## 2023-05-10 VITALS
BODY MASS INDEX: 27.09 KG/M2 | DIASTOLIC BLOOD PRESSURE: 76 MMHG | WEIGHT: 138 LBS | SYSTOLIC BLOOD PRESSURE: 124 MMHG | HEIGHT: 60 IN

## 2023-05-10 DIAGNOSIS — N95.0 POSTMENOPAUSAL BLEEDING: Primary | ICD-10-CM

## 2023-05-10 RX ORDER — TRANEXAMIC ACID 650 MG/1
1300 TABLET ORAL 2 TIMES DAILY
Qty: 20 TABLET | Refills: 0 | Status: SHIPPED | OUTPATIENT
Start: 2023-05-10 | End: 2023-05-15 | Stop reason: HOSPADM

## 2023-05-11 NOTE — PROGRESS NOTES
Assessment/Plan:     Diagnoses and all orders for this visit:    Postmenopausal bleeding  -     Tranexamic Acid 650 MG TABS; Take 2 tablets (1,300 mg total) by mouth 2 (two) times a day for 5 days        [id number] female  Biopsy lichen sclerosis possible lichen planus  Post menopause bleeding endometrial biopsy suspicious hyperplasia without atypia  Prior to vaginal delivery 1 C section  Plan   We will proceed with hysteroscopy D&C evaluation of the endometrial cavity  Tranexamic acid to help to stop the bleeding till time of procedure      Subjective:      Patient ID: Adrienne Garner is a 46 y o  female  HPI  Patient seen evaluated presents to the office today secondary to episode of postmenopausal bleeding  Patient last menstrual period was 2 years ago patient's bleeding is heavy changing pads frequently every 1-2 hours      Endometrial biopsy results reviewed and discussed with patient  A  Endometrium:  - Inactive endometrium with cilia metaplasia and breakdown    - Minute focus suspicious for hyperplasia without atypia  - No carcinoma identified    Based on this result I recommend to proceed with hysteroscopy D&C evaluation of the endometrial cavity  Procedure explained and discussed with patient risk of bleeding infection trauma perforation need another surgery all explained and discussed with patient  In the meanwhile tranexamic acid will be called to the patient pharmacy to help stop current bleeding  Plan explained and discussed with patient all patient questions answered and patient was satisfied    The following portions of the patient's history were reviewed and updated as appropriate: allergies, current medications, past family history, past medical history, past social history, past surgical history and problem list     Review of Systems      Objective:      /76 (BP Location: Left arm, Patient Position: Sitting, Cuff Size: Adult)   Ht 5' (1 524 m)   Wt 62 6 kg (138 lb)   LMP 06/25/2018 (Approximate)   BMI 26 95 kg/m²     UTERUS:  The uterus is anteverted in position, measuring 11 7 x 4 6 x 5 8 cm  The uterus has an unremarkable contour and echotexture  Nabothian cyst(s) present, cervix otherwise within normal limits      ENDOMETRIUM:  The endometrial echo complex has an AP caliber of 6 0 mm  No sonographically evident endometrial mass      OVARIES/ADNEXA:  Right ovary:  2 1 x 2 5 x 1 4 cm  3 8 mL  Left ovary:  1 6 x 1 8 x 1 5 cm  2 4 mL  Ovarian Doppler flow is within normal limits  No suspicious ovarian or adnexal abnormality      OTHER:  No free fluid or loculated fluid collections      IMPRESSION:     Unremarkable pelvic ultrasound            Physical Exam  Constitutional:       Appearance: Normal appearance  HENT:      Head: Normocephalic and atraumatic  Cardiovascular:      Rate and Rhythm: Normal rate and regular rhythm  Pulmonary:      Effort: Pulmonary effort is normal       Breath sounds: Normal breath sounds  Abdominal:      General: Abdomen is flat  Bowel sounds are normal       Palpations: Abdomen is soft  Musculoskeletal:      Right lower leg: No edema  Left lower leg: No edema  Neurological:      General: No focal deficit present  Mental Status: She is alert and oriented to person, place, and time     Psychiatric:         Mood and Affect: Mood normal          Behavior: Behavior normal

## 2023-05-12 NOTE — PRE-PROCEDURE INSTRUCTIONS
Pre-Surgery Instructions:   Medication Instructions   • ibuprofen (MOTRIN) 600 mg tablet Stop taking 3 days prior to surgery  • Tranexamic Acid 650 MG TABS Take day of surgery  Have you had / have a sore throat? No   Have you had / have a cough less than 1 week? No  Have you had / have a fever greater than 100 0 - 100  4? No  Are you experiencing any shortness of breath? No    Review with patient via phone medications and showering instructions  Instructed to avoid all ASA and OTC Vit/Supp prior to surgery and to avoid NSAIDs 3 days prior to surgery per anesthesia instructions  Tylenol ok to take prn  [facility] call with surgery schedule time, nothing eat or drink after midnight  Verbalized understanding

## 2023-05-15 ENCOUNTER — ANESTHESIA (OUTPATIENT)
Dept: PERIOP | Facility: HOSPITAL | Age: 53
End: 2023-05-15

## 2023-05-15 ENCOUNTER — HOSPITAL ENCOUNTER (OUTPATIENT)
Facility: HOSPITAL | Age: 53
Setting detail: OUTPATIENT SURGERY
Discharge: HOME | End: 2023-05-15
Attending: OBSTETRICS & GYNECOLOGY | Admitting: OBSTETRICS & GYNECOLOGY

## 2023-05-15 ENCOUNTER — ANESTHESIA EVENT (OUTPATIENT)
Dept: PERIOP | Facility: HOSPITAL | Age: 53
End: 2023-05-15

## 2023-05-15 VITALS
WEIGHT: 136 LBS | RESPIRATION RATE: 18 BRPM | SYSTOLIC BLOOD PRESSURE: 117 MMHG | HEIGHT: 60 IN | DIASTOLIC BLOOD PRESSURE: 67 MMHG | HEART RATE: 64 BPM | BODY MASS INDEX: 26.7 KG/M2 | OXYGEN SATURATION: 98 % | TEMPERATURE: 96.7 F

## 2023-05-15 DIAGNOSIS — N95.0 PMB (POSTMENOPAUSAL BLEEDING): ICD-10-CM

## 2023-05-15 PROBLEM — N20.0 NEPHROLITHIASIS: Status: ACTIVE | Noted: 2023-05-15

## 2023-05-15 RX ORDER — LIDOCAINE HYDROCHLORIDE 20 MG/ML
INJECTION, SOLUTION EPIDURAL; INFILTRATION; INTRACAUDAL; PERINEURAL AS NEEDED
Status: DISCONTINUED | OUTPATIENT
Start: 2023-05-15 | End: 2023-05-15

## 2023-05-15 RX ORDER — METOCLOPRAMIDE HYDROCHLORIDE 5 MG/ML
10 INJECTION INTRAMUSCULAR; INTRAVENOUS ONCE AS NEEDED
Status: DISCONTINUED | OUTPATIENT
Start: 2023-05-15 | End: 2023-05-15 | Stop reason: HOSPADM

## 2023-05-15 RX ORDER — ALBUTEROL SULFATE 2.5 MG/3ML
2.5 SOLUTION RESPIRATORY (INHALATION) ONCE AS NEEDED
Status: DISCONTINUED | OUTPATIENT
Start: 2023-05-15 | End: 2023-05-15 | Stop reason: HOSPADM

## 2023-05-15 RX ORDER — MIDAZOLAM HYDROCHLORIDE 2 MG/2ML
INJECTION, SOLUTION INTRAMUSCULAR; INTRAVENOUS AS NEEDED
Status: DISCONTINUED | OUTPATIENT
Start: 2023-05-15 | End: 2023-05-15

## 2023-05-15 RX ORDER — SODIUM CHLORIDE, SODIUM LACTATE, POTASSIUM CHLORIDE, CALCIUM CHLORIDE 600; 310; 30; 20 MG/100ML; MG/100ML; MG/100ML; MG/100ML
50 INJECTION, SOLUTION INTRAVENOUS CONTINUOUS
Status: DISCONTINUED | OUTPATIENT
Start: 2023-05-15 | End: 2023-05-15 | Stop reason: HOSPADM

## 2023-05-15 RX ORDER — FENTANYL CITRATE 50 UG/ML
INJECTION, SOLUTION INTRAMUSCULAR; INTRAVENOUS AS NEEDED
Status: DISCONTINUED | OUTPATIENT
Start: 2023-05-15 | End: 2023-05-15

## 2023-05-15 RX ORDER — DEXAMETHASONE SODIUM PHOSPHATE 10 MG/ML
INJECTION, SOLUTION INTRAMUSCULAR; INTRAVENOUS AS NEEDED
Status: DISCONTINUED | OUTPATIENT
Start: 2023-05-15 | End: 2023-05-15

## 2023-05-15 RX ORDER — FENTANYL CITRATE/PF 50 MCG/ML
50 SYRINGE (ML) INJECTION
Status: DISCONTINUED | OUTPATIENT
Start: 2023-05-15 | End: 2023-05-15 | Stop reason: HOSPADM

## 2023-05-15 RX ORDER — PROPOFOL 10 MG/ML
INJECTION, EMULSION INTRAVENOUS AS NEEDED
Status: DISCONTINUED | OUTPATIENT
Start: 2023-05-15 | End: 2023-05-15

## 2023-05-15 RX ORDER — LABETALOL HYDROCHLORIDE 5 MG/ML
10 INJECTION, SOLUTION INTRAVENOUS
Status: DISCONTINUED | OUTPATIENT
Start: 2023-05-15 | End: 2023-05-15 | Stop reason: HOSPADM

## 2023-05-15 RX ORDER — HYDROMORPHONE HCL/PF 1 MG/ML
0.5 SYRINGE (ML) INJECTION
Status: DISCONTINUED | OUTPATIENT
Start: 2023-05-15 | End: 2023-05-15 | Stop reason: HOSPADM

## 2023-05-15 RX ORDER — ONDANSETRON 2 MG/ML
4 INJECTION INTRAMUSCULAR; INTRAVENOUS ONCE AS NEEDED
Status: DISCONTINUED | OUTPATIENT
Start: 2023-05-15 | End: 2023-05-15 | Stop reason: HOSPADM

## 2023-05-15 RX ORDER — SODIUM CHLORIDE, SODIUM LACTATE, POTASSIUM CHLORIDE, CALCIUM CHLORIDE 600; 310; 30; 20 MG/100ML; MG/100ML; MG/100ML; MG/100ML
125 INJECTION, SOLUTION INTRAVENOUS CONTINUOUS
Status: DISCONTINUED | OUTPATIENT
Start: 2023-05-15 | End: 2023-05-15 | Stop reason: HOSPADM

## 2023-05-15 RX ORDER — ONDANSETRON 2 MG/ML
INJECTION INTRAMUSCULAR; INTRAVENOUS AS NEEDED
Status: DISCONTINUED | OUTPATIENT
Start: 2023-05-15 | End: 2023-05-15

## 2023-05-15 RX ORDER — HYDRALAZINE HYDROCHLORIDE 20 MG/ML
5 INJECTION INTRAMUSCULAR; INTRAVENOUS ONCE AS NEEDED
Status: DISCONTINUED | OUTPATIENT
Start: 2023-05-15 | End: 2023-05-15 | Stop reason: HOSPADM

## 2023-05-15 RX ORDER — SODIUM CHLORIDE, SODIUM LACTATE, POTASSIUM CHLORIDE, CALCIUM CHLORIDE 600; 310; 30; 20 MG/100ML; MG/100ML; MG/100ML; MG/100ML
INJECTION, SOLUTION INTRAVENOUS CONTINUOUS PRN
Status: DISCONTINUED | OUTPATIENT
Start: 2023-05-15 | End: 2023-05-15

## 2023-05-15 RX ADMIN — LIDOCAINE HYDROCHLORIDE 100 MG: 20 INJECTION, SOLUTION EPIDURAL; INFILTRATION; INTRACAUDAL; PERINEURAL at 07:52

## 2023-05-15 RX ADMIN — DEXAMETHASONE SODIUM PHOSPHATE 10 MG: 10 INJECTION, SOLUTION INTRAMUSCULAR; INTRAVENOUS at 07:54

## 2023-05-15 RX ADMIN — FENTANYL CITRATE 50 MCG: 50 INJECTION, SOLUTION INTRAMUSCULAR; INTRAVENOUS at 07:54

## 2023-05-15 RX ADMIN — MIDAZOLAM 2 MG: 1 INJECTION INTRAMUSCULAR; INTRAVENOUS at 07:46

## 2023-05-15 RX ADMIN — PROPOFOL 150 MG: 10 INJECTION, EMULSION INTRAVENOUS at 07:53

## 2023-05-15 RX ADMIN — ONDANSETRON 4 MG: 2 INJECTION INTRAMUSCULAR; INTRAVENOUS at 07:54

## 2023-05-15 RX ADMIN — SODIUM CHLORIDE, SODIUM LACTATE, POTASSIUM CHLORIDE, AND CALCIUM CHLORIDE: .6; .31; .03; .02 INJECTION, SOLUTION INTRAVENOUS at 07:40

## 2023-05-15 NOTE — INTERVAL H&P NOTE
H&P reviewed  After examining the patient I find no changes in the patients condition since the H&P had been written      Vitals:    05/15/23 0704   BP: 125/73   Pulse: 66   Resp: 14   Temp: (!) 97 1 °F (36 2 °C)   SpO2: 99%

## 2023-05-15 NOTE — OP NOTE
OPERATIVE REPORT  PATIENT NAME: Jody Davenport    :  1970  MRN: [id number]  Pt Location:  OR ROOM 02    SURGERY DATE: 5/15/2023    Surgeon(s) and Role:     * Elle Abreu MD - Primary    Preop Diagnosis:  PMB (postmenopausal bleeding) [N95 0]    Post-Op Diagnosis Codes:     * PMB (postmenopausal bleeding) [N95 0]    Procedure(s):  DILATATION AND CURETTAGE (D&C) WITH HYSTEROSCOPY    Specimen(s):  ID Type Source Tests Collected by Time Destination   1 : Endometrial currettings Tissue Endometrium TISSUE EXAM Elle Abreu MD 5/15/2023  8:06 AM        Estimated Blood Loss:   Minimal    Drains:  * No LDAs found *    Anesthesia Type:   General    Operative Indications:  PMB (postmenopausal bleeding) [N95 0]      Operative Findings:  Small endometrium cavity with scar tissue noted both ostium were visualized    Complications:   None    Procedure and Technique:  Brief History    All risks, benefits, and alternatives to the procedure were discussed with the patient and she had the opportunity to ask questions  Informed consent was obtained  Description of Procedure    Patient was taken to the operating room were a time out was performed to confirm correct patient and correct procedure  General LMA anesthesia (LMA) was administered and the patient was positioned on the OR table in the dorsal lithotomy position  All pressure points were padded and a kathy hugger was placed to maintain control of core body temperature  A bimanual exam was performed and the uterus was noted to be anteverted, normal in size and consistency with no palpable adnexal masses or fullness  The patient was prepped and draped in the usual sterile fashion  Operative Technique    Patient voided prior going to the OR  A [place] retractor was inserted into the vagina and [place] retractor was used to visualize the anterior lip of the cervix, which was then grasped with an Allis clamp   The cervix was dilated using aqua dissection through the hysteroscope     Hysteroscope was introduced under direct visualization using normal saline solution as the distention media  Hysteroscope was advanced to the uterine fundus and the entire uterine cavity was inspected in a systematic manner  There was noted to be smooth cavity some scar tissue noted no polyp or other abnormality noted both fallopian ostium was visualized  Hysteroscope was withdrawn and sharp curetting was performed, starting at the 12'oclock position and rotating a total of 360 degrees to cover all surfaces  Endometrial tissue was obtained and sent for pathology  The Allis clamp was removed from the anterior lip of the cervix  Good hemostasis was confirmed, retractor removed from the vagina  Patient moved to the dorsal supine position  At the conclusion of the procedure, all needle, sponge, and instrument counts were noted to be correct x2  Patient tolerated the procedure well and was transferred to PACU in stable condition prior to discharge with follow up in 1-2 weeks  I was present for the entire procedure      Patient Disposition:  PACU         SIGNATURE: Meena Fulton MD  DATE: May 15, 2023  TIME: 8:14 AM

## 2023-05-15 NOTE — ANESTHESIA PREPROCEDURE EVALUATION
Procedure:  DILATATION AND CURETTAGE (D&C) WITH HYSTEROSCOPY (Uterus)    Relevant Problems   /RENAL   (+) Nephrolithiasis      Other   (+) Symptoms of depression        Physical Exam    Airway    Mallampati score: II  TM Distance: >3 FB  Neck ROM: full     Dental   No notable dental hx     Cardiovascular      Pulmonary      Other Findings        Anesthesia Plan  ASA Score- 2     Anesthesia Type- general with ASA Monitors  Additional Monitors:   Airway Plan: LMA  Plan Factors-Exercise tolerance (METS): >4 METS  Chart reviewed  Existing labs reviewed  Patient summary reviewed  Patient is not a current smoker  Patient did not smoke on day of surgery  Induction- intravenous  Postoperative Plan- Plan for postoperative opioid use  Informed Consent- Anesthetic plan and risks discussed with patient  I personally reviewed this patient with the CRNA  Discussed and agreed on the Anesthesia Plan with the CRNA  Nuvia Bangura

## 2023-05-15 NOTE — ANESTHESIA POSTPROCEDURE EVALUATION
Post-Op Assessment Note    CV Status:  Stable  Pain Score: 0    Pain management: adequate     Mental Status:  Awake   Hydration Status:  Stable   PONV Controlled:  Controlled   Airway Patency:  Patent      Post Op Vitals Reviewed: Yes      Staff: CRNA         There were no known notable events for this encounter      BP   96/57   Temp  98 0   Pulse  66   Resp   11   SpO2   98

## 2023-05-15 NOTE — H&P
[id number] female  Biopsy lichen sclerosis possible lichen planus  Post menopause bleeding endometrial biopsy suspicious hyperplasia without atypia  Prior to vaginal delivery 1 C section  Plan   We will proceed with hysteroscopy D&C evaluation of the endometrial cavity  Tranexamic acid to help to stop the bleeding till time of procedure        Subjective:       Patient ID: Rubia Valdez is a 46 y o  female      HPI  Patient seen evaluated presents to the office today secondary to episode of postmenopausal bleeding  Patient last menstrual period was 2 years ago patient's bleeding is heavy changing pads frequently every 1-2 hours        Endometrial biopsy results reviewed and discussed with patient  A  Endometrium:  - Inactive endometrium with cilia metaplasia and breakdown  - Minute focus suspicious for hyperplasia without atypia  - No carcinoma identified     Based on this result I recommend to proceed with hysteroscopy D&C evaluation of the endometrial cavity  Procedure explained and discussed with patient risk of bleeding infection trauma perforation need another surgery all explained and discussed with patient  In the meanwhile tranexamic acid will be called to the patient pharmacy to help stop current bleeding  Plan explained and discussed with patient all patient questions answered and patient was satisfied     The following portions of the patient's history were reviewed and updated as appropriate: allergies, current medications, past family history, past medical history, past social history, past surgical history and problem list      Review of Systems       Objective:        /76 (BP Location: Left arm, Patient Position: Sitting, Cuff Size: Adult)   Ht 5' (1 524 m)   Wt 62 6 kg (138 lb)   LMP 06/25/2018 (Approximate)   BMI 26 95 kg/m²      UTERUS:  The uterus is anteverted in position, measuring 11 7 x 4 6 x 5 8 cm  The uterus has an unremarkable contour and echotexture    Nabothian cyst(s) present, cervix otherwise within normal limits      ENDOMETRIUM:  The endometrial echo complex has an AP caliber of 6 0 mm  No sonographically evident endometrial mass      OVARIES/ADNEXA:  Right ovary:  2 1 x 2 5 x 1 4 cm  3 8 mL  Left ovary:  1 6 x 1 8 x 1 5 cm  2 4 mL  Ovarian Doppler flow is within normal limits  No suspicious ovarian or adnexal abnormality      OTHER:  No free fluid or loculated fluid collections      IMPRESSION:     Unremarkable pelvic ultrasound             Physical Exam  Constitutional:       Appearance: Normal appearance  HENT:      Head: Normocephalic and atraumatic  Cardiovascular:      Rate and Rhythm: Normal rate and regular rhythm  Pulmonary:      Effort: Pulmonary effort is normal       Breath sounds: Normal breath sounds  Abdominal:      General: Abdomen is flat  Bowel sounds are normal       Palpations: Abdomen is soft  Musculoskeletal:      Right lower leg: No edema  Left lower leg: No edema  Neurological:      General: No focal deficit present  Mental Status: She is alert and oriented to person, place, and time     Psychiatric:         Mood and Affect: Mood normal          Behavior: Behavior normal

## 2023-05-23 ENCOUNTER — ANNUAL EXAM (OUTPATIENT)
Dept: OBGYN CLINIC | Facility: CLINIC | Age: 53
End: 2023-05-23

## 2023-05-23 VITALS
WEIGHT: 137 LBS | BODY MASS INDEX: 26.9 KG/M2 | SYSTOLIC BLOOD PRESSURE: 120 MMHG | DIASTOLIC BLOOD PRESSURE: 84 MMHG | HEIGHT: 60 IN

## 2023-05-23 DIAGNOSIS — Z12.31 SCREENING MAMMOGRAM, ENCOUNTER FOR: ICD-10-CM

## 2023-05-23 DIAGNOSIS — Z01.419 WOMEN'S ANNUAL ROUTINE GYNECOLOGICAL EXAMINATION: Primary | ICD-10-CM

## 2023-05-23 NOTE — PROGRESS NOTES
Gideon Reynoso is a 46 y o  female who presents for annual well woman exam      And pots D&C   Denies any PMB since procedure  Denies any complaint       Endometrium, Endometrial currettings:  - Scanty amount of benign inactive appearing endometrial glandular epithelium  - Benign squamous epithelium and endocervical glandular epithelium  - Abundant mucinous material      Menstrual History:  OB History        4    Para   3    Term   3            AB   1    Living   3       SAB        IAB   1    Ectopic        Multiple        Live Births                      Patient's last menstrual period was 2018 (approximate)  The following portions of the patient's history were reviewed and updated as appropriate: allergies, current medications, past family history, past medical history, past social history, past surgical history and problem list     Review of Systems  Review of Systems   Constitutional: Negative for activity change, appetite change, chills, fatigue and fever  Respiratory: Negative for apnea, cough, chest tightness and shortness of breath  Cardiovascular: Negative for chest pain, palpitations and leg swelling  Gastrointestinal: Negative for abdominal pain, constipation, diarrhea, nausea and vomiting  Genitourinary: Negative for difficulty urinating, dysuria, flank pain, frequency, hematuria and urgency  Neurological: Negative for dizziness, seizures, syncope, light-headedness, numbness and headaches  Psychiatric/Behavioral: Negative for agitation and confusion            Objective      /84 (BP Location: Left arm, Patient Position: Sitting, Cuff Size: Adult)   Ht 5' (1 524 m)   Wt 62 1 kg (137 lb)   LMP 2018 (Approximate)   BMI 26 76 kg/m²     Physical Exam  OBGyn Exam     General:   alert and oriented, in no acute distress, alert, appears stated age and cooperative   Heart: regular rate and rhythm, S1, S2 normal, no murmur, click, rub or gallop Lungs: clear to auscultation bilaterally   Abdomen: soft, non-tender, without masses or organomegaly   Vulva: normal   Vagina: normal mucosa, normal discharge   Cervix: no cervical motion tenderness and no lesions   Uterus: normal size   Adnexa:  Breast Exam:  normal adnexa  breasts appear normal, no suspicious masses, no skin or nipple changes or axillary nodes  Assessment      @well woman@   39-year-old female  Annual exam   Biopsy lichen sclerosis possible lichen planus asymptomatic  Post menopause bleeding endometrial biopsy suspicious hyperplasia without atypia d&c neg  Prior to vaginal delivery 1 C section  Plan   Pap/hpv neg 2022  Diet/exercsie  Ca/vit D  mammo  Repeat U/S in 6-12 m f/w endometrial thickness  To notify us if any PMB consider repeat EMB  To rto for annual exam    All questions answered  There are no Patient Instructions on file for this visit

## 2025-05-19 ENCOUNTER — VBI (OUTPATIENT)
Dept: ADMINISTRATIVE | Facility: OTHER | Age: 55
End: 2025-05-19

## 2025-05-19 NOTE — TELEPHONE ENCOUNTER
05/19/25 2:45 PM    The patient was called and a message could not be left on the phone number on file/ phone number on file is incorrect.    Thank you.  Migdalia Campa MA  [facility]

## 2025-08-02 ENCOUNTER — HOSPITAL ENCOUNTER (EMERGENCY)
Facility: HOSPITAL | Age: 55
Discharge: HOME | End: 2025-08-02
Attending: EMERGENCY MEDICINE | Admitting: EMERGENCY MEDICINE
Payer: COMMERCIAL

## 2025-08-02 ENCOUNTER — APPOINTMENT (EMERGENCY)
Dept: CT IMAGING | Facility: HOSPITAL | Age: 55
End: 2025-08-02
Payer: COMMERCIAL

## 2025-08-02 VITALS
SYSTOLIC BLOOD PRESSURE: 113 MMHG | DIASTOLIC BLOOD PRESSURE: 64 MMHG | RESPIRATION RATE: 18 BRPM | HEART RATE: 87 BPM | OXYGEN SATURATION: 99 % | TEMPERATURE: 99.2 F

## 2025-08-02 DIAGNOSIS — K57.92 DIVERTICULITIS: Primary | ICD-10-CM

## 2025-08-02 LAB
ALBUMIN SERPL BCG-MCNC: 4.5 G/DL (ref 3.5–5)
ALP SERPL-CCNC: 56 U/L (ref 34–104)
ALT SERPL W P-5'-P-CCNC: 20 U/L (ref 7–52)
ANION GAP SERPL CALCULATED.3IONS-SCNC: 8 MMOL/L (ref 4–13)
AST SERPL W P-5'-P-CCNC: 14 U/L (ref 13–39)
BACTERIA UR QL AUTO: NORMAL /HPF
BASOPHILS # BLD AUTO: 0.04 THOUSANDS/ÂΜL (ref 0–0.1)
BASOPHILS NFR BLD AUTO: 0 % (ref 0–1)
BILIRUB SERPL-MCNC: 0.95 MG/DL (ref 0.2–1)
BILIRUB UR QL STRIP: NEGATIVE
BUN SERPL-MCNC: 8 MG/DL (ref 5–25)
CALCIUM SERPL-MCNC: 9.6 MG/DL (ref 8.4–10.2)
CHLORIDE SERPL-SCNC: 102 MMOL/L (ref 96–108)
CLARITY UR: CLEAR
CO2 SERPL-SCNC: 28 MMOL/L (ref 21–32)
COLOR UR: YELLOW
CREAT SERPL-MCNC: 0.57 MG/DL (ref 0.6–1.3)
EOSINOPHIL # BLD AUTO: 0.05 THOUSAND/ÂΜL (ref 0–0.61)
EOSINOPHIL NFR BLD AUTO: 0 % (ref 0–6)
ERYTHROCYTE [DISTWIDTH] IN BLOOD BY AUTOMATED COUNT: 14.1 % (ref 11.6–15.1)
GFR SERPL CREATININE-BSD FRML MDRD: 103 ML/MIN/1.73SQ M
GLUCOSE SERPL-MCNC: 99 MG/DL (ref 65–140)
GLUCOSE UR STRIP-MCNC: NEGATIVE MG/DL
HCT VFR BLD AUTO: 40.7 % (ref 34.8–46.1)
HGB BLD-MCNC: 13.3 G/DL (ref 11.5–15.4)
HGB UR QL STRIP.AUTO: ABNORMAL
IMM GRANULOCYTES # BLD AUTO: 0.08 THOUSAND/UL (ref 0–0.2)
IMM GRANULOCYTES NFR BLD AUTO: 1 % (ref 0–2)
KETONES UR STRIP-MCNC: NEGATIVE MG/DL
LEUKOCYTE ESTERASE UR QL STRIP: NEGATIVE
LIPASE SERPL-CCNC: 9 U/L (ref 11–82)
LYMPHOCYTES # BLD AUTO: 2.13 THOUSANDS/ÂΜL (ref 0.6–4.47)
LYMPHOCYTES NFR BLD AUTO: 14 % (ref 14–44)
MCH RBC QN AUTO: 28.5 PG (ref 26.8–34.3)
MCHC RBC AUTO-ENTMCNC: 32.7 G/DL (ref 31.4–37.4)
MCV RBC AUTO: 87 FL (ref 82–98)
MONOCYTES # BLD AUTO: 1.45 THOUSAND/ÂΜL (ref 0.17–1.22)
MONOCYTES NFR BLD AUTO: 9 % (ref 4–12)
NEUTROPHILS # BLD AUTO: 11.92 THOUSANDS/ÂΜL (ref 1.85–7.62)
NEUTS SEG NFR BLD AUTO: 76 % (ref 43–75)
NITRITE UR QL STRIP: NEGATIVE
NON-SQ EPI CELLS URNS QL MICRO: NORMAL /HPF
NRBC BLD AUTO-RTO: 0 /100 WBCS
PH UR STRIP.AUTO: 7.5 [PH]
PLATELET # BLD AUTO: 269 THOUSANDS/UL (ref 149–390)
PMV BLD AUTO: 13.2 FL (ref 8.9–12.7)
POTASSIUM SERPL-SCNC: 3.9 MMOL/L (ref 3.5–5.3)
PROT SERPL-MCNC: 7.6 G/DL (ref 6.4–8.4)
PROT UR STRIP-MCNC: NEGATIVE MG/DL
RBC # BLD AUTO: 4.67 MILLION/UL (ref 3.81–5.12)
RBC #/AREA URNS AUTO: NORMAL /HPF
SODIUM SERPL-SCNC: 138 MMOL/L (ref 135–147)
SP GR UR STRIP.AUTO: 1.01 (ref 1–1.03)
UROBILINOGEN UR QL STRIP.AUTO: 0.2 E.U./DL
WBC # BLD AUTO: 15.67 THOUSAND/UL (ref 4.31–10.16)
WBC #/AREA URNS AUTO: NORMAL /HPF

## 2025-08-02 PROCEDURE — 80053 COMPREHEN METABOLIC PANEL: CPT | Performed by: EMERGENCY MEDICINE

## 2025-08-02 PROCEDURE — 96374 THER/PROPH/DIAG INJ IV PUSH: CPT

## 2025-08-02 PROCEDURE — 83690 ASSAY OF LIPASE: CPT | Performed by: EMERGENCY MEDICINE

## 2025-08-02 PROCEDURE — 85025 COMPLETE CBC W/AUTO DIFF WBC: CPT | Performed by: EMERGENCY MEDICINE

## 2025-08-02 PROCEDURE — 99285 EMERGENCY DEPT VISIT HI MDM: CPT | Performed by: EMERGENCY MEDICINE

## 2025-08-02 PROCEDURE — 99284 EMERGENCY DEPT VISIT MOD MDM: CPT

## 2025-08-02 PROCEDURE — 81003 URINALYSIS AUTO W/O SCOPE: CPT | Performed by: EMERGENCY MEDICINE

## 2025-08-02 PROCEDURE — 81001 URINALYSIS AUTO W/SCOPE: CPT | Performed by: EMERGENCY MEDICINE

## 2025-08-02 PROCEDURE — 74177 CT ABD & PELVIS W/CONTRAST: CPT

## 2025-08-02 PROCEDURE — 36415 COLL VENOUS BLD VENIPUNCTURE: CPT | Performed by: EMERGENCY MEDICINE

## 2025-08-02 RX ORDER — CIPROFLOXACIN 500 MG/1
500 TABLET, FILM COATED ORAL 2 TIMES DAILY
Qty: 10 TABLET | Refills: 0 | Status: SHIPPED | OUTPATIENT
Start: 2025-08-02 | End: 2025-08-07

## 2025-08-02 RX ORDER — METRONIDAZOLE 500 MG/1
500 TABLET ORAL ONCE
Status: COMPLETED | OUTPATIENT
Start: 2025-08-02 | End: 2025-08-02

## 2025-08-02 RX ORDER — CIPROFLOXACIN 500 MG/1
500 TABLET, FILM COATED ORAL ONCE
Status: COMPLETED | OUTPATIENT
Start: 2025-08-02 | End: 2025-08-02

## 2025-08-02 RX ORDER — KETOROLAC TROMETHAMINE 30 MG/ML
15 INJECTION, SOLUTION INTRAMUSCULAR; INTRAVENOUS ONCE
Status: COMPLETED | OUTPATIENT
Start: 2025-08-02 | End: 2025-08-02

## 2025-08-02 RX ORDER — METRONIDAZOLE 500 MG/1
500 TABLET ORAL EVERY 8 HOURS SCHEDULED
Qty: 15 TABLET | Refills: 0 | Status: SHIPPED | OUTPATIENT
Start: 2025-08-02 | End: 2025-08-07

## 2025-08-02 RX ADMIN — KETOROLAC TROMETHAMINE 15 MG: 30 INJECTION, SOLUTION INTRAMUSCULAR at 14:17

## 2025-08-02 RX ADMIN — CIPROFLOXACIN 500 MG: 500 TABLET ORAL at 16:56

## 2025-08-02 RX ADMIN — IOHEXOL 85 ML: 350 INJECTION, SOLUTION INTRAVENOUS at 14:54

## 2025-08-02 RX ADMIN — METRONIDAZOLE 500 MG: 500 TABLET ORAL at 16:56

## (undated) DEVICE — TISSUE REMOVAL SYSTEM FLUID MANAGEMENT ACCESSORIES: Brand: SYMPHION

## (undated) DEVICE — STERILE SURGICAL LUBRICANT,  TUBE: Brand: SURGILUBE

## (undated) DEVICE — GLOVE PI ULTRA TOUCH SZ.6.5

## (undated) DEVICE — STRL ALLENTOWN HYSTEROSCOPY PK: Brand: CARDINAL HEALTH

## (undated) DEVICE — PVC URETHRAL CATHETER: Brand: DOVER

## (undated) DEVICE — GLOVE INDICATOR PI UNDERGLOVE SZ 6.5 BLUE

## (undated) DEVICE — TISSUE REMOVAL SYSTEM RESECTING DEVICE: Brand: SYMPHION

## (undated) DEVICE — LIGHT HANDLE COVER SLEEVE DISP BLUE STELLAR

## (undated) DEVICE — SPONGE STICK WITH PVP-I: Brand: KENDALL